# Patient Record
Sex: FEMALE | Race: ASIAN | Employment: FULL TIME | URBAN - METROPOLITAN AREA
[De-identification: names, ages, dates, MRNs, and addresses within clinical notes are randomized per-mention and may not be internally consistent; named-entity substitution may affect disease eponyms.]

---

## 2024-01-26 ENCOUNTER — TELEMEDICINE (OUTPATIENT)
Dept: BEHAVIORAL HEALTH | Facility: CLINIC | Age: 49
End: 2024-01-26
Payer: COMMERCIAL

## 2024-01-26 DIAGNOSIS — F41.9 ANXIETY: ICD-10-CM

## 2024-01-26 PROCEDURE — 90791 PSYCH DIAGNOSTIC EVALUATION: CPT | Performed by: PSYCHOLOGIST

## 2024-01-26 ASSESSMENT — ANXIETY QUESTIONNAIRES
GAD7 TOTAL SCORE: 6
1. FEELING NERVOUS, ANXIOUS, OR ON EDGE: SEVERAL DAYS
3. WORRYING TOO MUCH ABOUT DIFFERENT THINGS: MORE THAN HALF THE DAYS
4. TROUBLE RELAXING: NOT AT ALL
5. BEING SO RESTLESS THAT IT IS HARD TO SIT STILL: SEVERAL DAYS
6. BECOMING EASILY ANNOYED OR IRRITABLE: SEVERAL DAYS
7. FEELING AFRAID AS IF SOMETHING AWFUL MIGHT HAPPEN: NOT AT ALL
2. NOT BEING ABLE TO STOP OR CONTROL WORRYING: SEVERAL DAYS

## 2024-01-26 NOTE — PROGRESS NOTES
Time started: 300  Time ended: 400  Total time, virtual visit: 60 minutes     Behavioral Health Assessment    Verbal consent was requested and obtained from patient on this date for a telehealth visit.   We discussed that the note will be visible and others healthcare practitioners will have access. The patient has consented to an unrestricted note due to working with other providers at [facility].     Reason for referral: increased irritability, mood swings, weight gain and changes in body composition.    Referral: Dr. Mariel MD  and Oxana Harrison, [employer] [facility]      Brief history:   Abbie is a 48 year-old , Syriac female. The patient was born and raised in [place] and raised by her biological parents. She has lived in the  for 20 years. The patient does not have children. Never pregnant and never tried infertility treatments. The patient has a younger brother who lives in [facility].   Employment: She is employed in [employer], partner in [employer]. [employer].   Education: Bachelors in Economics.     Chief complaint(s): increased irritability, mood swings, weight gain and changes in body composition.      Last menstrual cycle: It was irregular starting 2 years ago. Started taking birth control pills, which stopped the cycles.   Other menopause transition-related symptoms: mood swings, weight gain, body fat and body composition does not change even with lifestyle change. Vasomotor symptoms: none. Hot at night if she covers with too many blankets.    Symptoms: Mood swings started 6-10 months, easily irritated by small things that she did not use to care about.   stress effects her mood swings.   Current coping: Lifestyle changes, exercise and daily diet    Weight gain started to be a problem: snacking was her coping. Over the COVID pandemic, she gained 20 pounds. Grazing in the evening after dinner to cope with stress.     Memory: she has become more forgetful, such as leaving things  at home. She can remember peoples names. Some moments of having a hard time recalling things. Suggested activities to help improve her memory.     Mental health:   Depression: never in treatment and denied a diagnostic hx  Anxiety: denied   Panic attacks: denied  PTSD: denied   Abuse history: denied   Feel safe at home? Yes     Stress: November and December due to losing people close to her over the past 2 years. In additions, she was thrown off her horse, resulting in a concussion November 7, 2023. It took 2 weeks to recover.  After the concussion, other things happened, such as her neighbor passing away due to a bad fall.    Over the past couple of years, during the winter months, her neighbor, gm, and  passed away.   In the past: sister in law (2022 in the summer), grandargenis marcus passed away.     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     MSE: Abbie was casually dressed and neatly groomed. She was pleasant throughout the evaluation. She reported feeling afraid of specific things. Her affect was observed as anxious. No SI or plan. Judgement and decision making overall appears good. Thought processes contained anxious themes. Her tone of voice and rate of speech were WNL.    Follow up: help to manage anxiety provoking things.   ANAHI-7= 6, which falls in the range of mild generalized anxiety.   Will assess for specific phobias and assess for her thoughts about ageing   Follow up in 2 to 3 weeks. Abbie will call to schedule.